# Patient Record
Sex: MALE | Race: WHITE | Employment: FULL TIME | URBAN - METROPOLITAN AREA
[De-identification: names, ages, dates, MRNs, and addresses within clinical notes are randomized per-mention and may not be internally consistent; named-entity substitution may affect disease eponyms.]

---

## 2017-02-01 DIAGNOSIS — Z01.818 PREOP TESTING: ICD-10-CM

## 2017-02-01 DIAGNOSIS — I26.99 OTHER ACUTE PULMONARY EMBOLISM WITHOUT ACUTE COR PULMONALE (HCC): Primary | ICD-10-CM

## 2017-02-04 ENCOUNTER — EKG ENCOUNTER (OUTPATIENT)
Dept: LAB | Age: 47
End: 2017-02-04
Attending: INTERNAL MEDICINE
Payer: COMMERCIAL

## 2017-02-04 DIAGNOSIS — I26.99 OTHER ACUTE PULMONARY EMBOLISM WITHOUT ACUTE COR PULMONALE (HCC): ICD-10-CM

## 2017-02-04 DIAGNOSIS — Z01.818 PREOP TESTING: ICD-10-CM

## 2017-02-04 DIAGNOSIS — Z79.01 PULMONARY EMBOLISM ON LONG-TERM ANTICOAGULATION THERAPY (HCC): ICD-10-CM

## 2017-02-04 DIAGNOSIS — I26.99 PULMONARY EMBOLISM ON LONG-TERM ANTICOAGULATION THERAPY (HCC): ICD-10-CM

## 2017-02-04 LAB
ALBUMIN SERPL-MCNC: 4.1 G/DL (ref 3.5–4.8)
ALP LIVER SERPL-CCNC: 98 U/L (ref 45–117)
ALT SERPL-CCNC: 39 U/L (ref 17–63)
AST SERPL-CCNC: 27 U/L (ref 15–41)
ATRIAL RATE: 93 BPM
BASOPHILS # BLD AUTO: 0.03 X10(3) UL (ref 0–0.1)
BASOPHILS NFR BLD AUTO: 0.5 %
BILIRUB SERPL-MCNC: 0.4 MG/DL (ref 0.1–2)
BUN BLD-MCNC: 23 MG/DL (ref 8–20)
CALCIUM BLD-MCNC: 8.6 MG/DL (ref 8.3–10.3)
CHLORIDE: 107 MMOL/L (ref 101–111)
CO2: 23 MMOL/L (ref 22–32)
CREAT BLD-MCNC: 1.24 MG/DL (ref 0.7–1.3)
EOSINOPHIL # BLD AUTO: 0.49 X10(3) UL (ref 0–0.3)
EOSINOPHIL NFR BLD AUTO: 8.1 %
ERYTHROCYTE [DISTWIDTH] IN BLOOD BY AUTOMATED COUNT: 12.5 % (ref 11.5–16)
GLUCOSE BLD-MCNC: 80 MG/DL (ref 70–99)
HCT VFR BLD AUTO: 46.7 % (ref 37–53)
HGB BLD-MCNC: 16.1 G/DL (ref 13–17)
IMMATURE GRANULOCYTE COUNT: 0.01 X10(3) UL (ref 0–1)
IMMATURE GRANULOCYTE RATIO %: 0.2 %
LYMPHOCYTES # BLD AUTO: 1.98 X10(3) UL (ref 0.9–4)
LYMPHOCYTES NFR BLD AUTO: 32.7 %
M PROTEIN MFR SERPL ELPH: 7.2 G/DL (ref 6.1–8.3)
MCH RBC QN AUTO: 31.8 PG (ref 27–33.2)
MCHC RBC AUTO-ENTMCNC: 34.5 G/DL (ref 31–37)
MCV RBC AUTO: 92.3 FL (ref 80–99)
MONOCYTES # BLD AUTO: 0.69 X10(3) UL (ref 0.1–0.6)
MONOCYTES NFR BLD AUTO: 11.4 %
NEUTROPHIL ABS PRELIM: 2.86 X10 (3) UL (ref 1.3–6.7)
NEUTROPHILS # BLD AUTO: 2.86 X10(3) UL (ref 1.3–6.7)
NEUTROPHILS NFR BLD AUTO: 47.1 %
P AXIS: 44 DEGREES
P-R INTERVAL: 132 MS
PLATELET # BLD AUTO: 201 10(3)UL (ref 150–450)
POTASSIUM SERPL-SCNC: 3.6 MMOL/L (ref 3.6–5.1)
Q-T INTERVAL: 364 MS
QRS DURATION: 82 MS
QTC CALCULATION (BEZET): 452 MS
R AXIS: 3 DEGREES
RBC # BLD AUTO: 5.06 X10(6)UL (ref 4.3–5.7)
RED CELL DISTRIBUTION WIDTH-SD: 41.7 FL (ref 35.1–46.3)
SODIUM SERPL-SCNC: 141 MMOL/L (ref 136–144)
T AXIS: -7 DEGREES
VENTRICULAR RATE: 93 BPM
WBC # BLD AUTO: 6.1 X10(3) UL (ref 4–13)

## 2017-02-04 PROCEDURE — 93005 ELECTROCARDIOGRAM TRACING: CPT

## 2017-02-04 PROCEDURE — 93010 ELECTROCARDIOGRAM REPORT: CPT | Performed by: INTERNAL MEDICINE

## 2017-02-04 PROCEDURE — 80053 COMPREHEN METABOLIC PANEL: CPT

## 2017-02-04 PROCEDURE — 36415 COLL VENOUS BLD VENIPUNCTURE: CPT

## 2017-02-04 PROCEDURE — 85025 COMPLETE CBC W/AUTO DIFF WBC: CPT

## 2017-02-06 ENCOUNTER — TELEPHONE (OUTPATIENT)
Dept: HEMATOLOGY/ONCOLOGY | Facility: HOSPITAL | Age: 47
End: 2017-02-06

## 2017-02-06 NOTE — TELEPHONE ENCOUNTER
Pre-op instructions called to patient. Per Dr. Elian Rodriguez, hold Xarelto 72 hours prior to surgery. Restart at 20mg daily 48-72 hours after per podiatrist's comfort. Pre-op forms faxed to [facility] and [facility], attn: Sunny Fernandez for surgery 2/10.

## 2017-02-06 NOTE — TELEPHONE ENCOUNTER
Mr. Caleb Lawrence is scheduled to get a right cheilectomy with possible 1st metatarsal head implant arthroplasty on 2/10/17. He is currently on xarelto 20mg daily for history of unprovoked PE without DVT.       Recent CBC, CMP, EKG are unremarkable as below:    L

## 2017-02-25 ENCOUNTER — APPOINTMENT (OUTPATIENT)
Dept: GENERAL RADIOLOGY | Age: 47
End: 2017-02-25
Attending: EMERGENCY MEDICINE
Payer: COMMERCIAL

## 2017-02-25 ENCOUNTER — HOSPITAL ENCOUNTER (EMERGENCY)
Age: 47
Discharge: HOME | End: 2017-02-25
Attending: EMERGENCY MEDICINE
Payer: COMMERCIAL

## 2017-02-25 ENCOUNTER — APPOINTMENT (OUTPATIENT)
Dept: CT IMAGING | Age: 47
End: 2017-02-25
Attending: EMERGENCY MEDICINE
Payer: COMMERCIAL

## 2017-02-25 VITALS
TEMPERATURE: 98 F | HEIGHT: 71 IN | WEIGHT: 230 LBS | BODY MASS INDEX: 32.2 KG/M2 | HEART RATE: 98 BPM | OXYGEN SATURATION: 99 % | RESPIRATION RATE: 18 BRPM | SYSTOLIC BLOOD PRESSURE: 158 MMHG | DIASTOLIC BLOOD PRESSURE: 101 MMHG

## 2017-02-25 DIAGNOSIS — R13.10 PAINFUL SWALLOWING: ICD-10-CM

## 2017-02-25 DIAGNOSIS — R10.11 ABDOMINAL PAIN, RIGHT UPPER QUADRANT: Primary | ICD-10-CM

## 2017-02-25 DIAGNOSIS — R19.7 DIARRHEA, UNSPECIFIED TYPE: ICD-10-CM

## 2017-02-25 LAB
ALBUMIN SERPL-MCNC: 3.5 G/DL (ref 3.5–4.8)
ALP LIVER SERPL-CCNC: 96 U/L (ref 45–117)
ALT SERPL-CCNC: 89 U/L (ref 17–63)
AST SERPL-CCNC: 39 U/L (ref 15–41)
BASOPHILS # BLD AUTO: 0.03 X10(3) UL (ref 0–0.1)
BASOPHILS NFR BLD AUTO: 0.4 %
BILIRUB SERPL-MCNC: 0.3 MG/DL (ref 0.1–2)
BILIRUB UR QL STRIP.AUTO: NEGATIVE
BUN BLD-MCNC: 24 MG/DL (ref 8–20)
CALCIUM BLD-MCNC: 8.7 MG/DL (ref 8.3–10.3)
CHLORIDE: 107 MMOL/L (ref 101–111)
CLARITY UR REFRACT.AUTO: CLEAR
CO2: 22 MMOL/L (ref 22–32)
COLOR UR AUTO: YELLOW
CREAT BLD-MCNC: 1.17 MG/DL (ref 0.7–1.3)
EOSINOPHIL # BLD AUTO: 0.22 X10(3) UL (ref 0–0.3)
EOSINOPHIL NFR BLD AUTO: 2.7 %
ERYTHROCYTE [DISTWIDTH] IN BLOOD BY AUTOMATED COUNT: 12.2 % (ref 11.5–16)
GLUCOSE BLD-MCNC: 108 MG/DL (ref 70–99)
GLUCOSE UR STRIP.AUTO-MCNC: NEGATIVE MG/DL
HCT VFR BLD AUTO: 45.5 % (ref 37–53)
HGB BLD-MCNC: 16.4 G/DL (ref 13–17)
IMMATURE GRANULOCYTE COUNT: 0.01 X10(3) UL (ref 0–1)
IMMATURE GRANULOCYTE RATIO %: 0.1 %
KETONES UR STRIP.AUTO-MCNC: NEGATIVE MG/DL
LEUKOCYTE ESTERASE UR QL STRIP.AUTO: NEGATIVE
LIPASE: 377 U/L (ref 73–393)
LYMPHOCYTES # BLD AUTO: 1.62 X10(3) UL (ref 0.9–4)
LYMPHOCYTES NFR BLD AUTO: 20.2 %
M PROTEIN MFR SERPL ELPH: 7 G/DL (ref 6.1–8.3)
MCH RBC QN AUTO: 32 PG (ref 27–33.2)
MCHC RBC AUTO-ENTMCNC: 36 G/DL (ref 31–37)
MCV RBC AUTO: 88.7 FL (ref 80–99)
MONOCYTES # BLD AUTO: 0.83 X10(3) UL (ref 0.1–0.6)
MONOCYTES NFR BLD AUTO: 10.4 %
NEUTROPHIL ABS PRELIM: 5.3 X10 (3) UL (ref 1.3–6.7)
NEUTROPHILS # BLD AUTO: 5.3 X10(3) UL (ref 1.3–6.7)
NEUTROPHILS NFR BLD AUTO: 66.2 %
NITRITE UR QL STRIP.AUTO: NEGATIVE
PH UR STRIP.AUTO: 5.5 [PH] (ref 4.5–8)
PLATELET # BLD AUTO: 160 10(3)UL (ref 150–450)
POTASSIUM SERPL-SCNC: 3.4 MMOL/L (ref 3.6–5.1)
PROT UR STRIP.AUTO-MCNC: NEGATIVE MG/DL
RBC # BLD AUTO: 5.13 X10(6)UL (ref 4.3–5.7)
RED CELL DISTRIBUTION WIDTH-SD: 39.8 FL (ref 35.1–46.3)
SODIUM SERPL-SCNC: 140 MMOL/L (ref 136–144)
SP GR UR STRIP.AUTO: >=1.03 (ref 1–1.03)
UROBILINOGEN UR STRIP.AUTO-MCNC: 0.2 MG/DL
WBC # BLD AUTO: 8 X10(3) UL (ref 4–13)

## 2017-02-25 PROCEDURE — 71010 XR CHEST AP PORTABLE  (CPT=71010): CPT

## 2017-02-25 PROCEDURE — 74177 CT ABD & PELVIS W/CONTRAST: CPT

## 2017-02-25 PROCEDURE — 99284 EMERGENCY DEPT VISIT MOD MDM: CPT

## 2017-02-25 PROCEDURE — 81001 URINALYSIS AUTO W/SCOPE: CPT | Performed by: EMERGENCY MEDICINE

## 2017-02-25 PROCEDURE — 96360 HYDRATION IV INFUSION INIT: CPT

## 2017-02-25 PROCEDURE — 85025 COMPLETE CBC W/AUTO DIFF WBC: CPT

## 2017-02-25 PROCEDURE — 83690 ASSAY OF LIPASE: CPT

## 2017-02-25 PROCEDURE — 80053 COMPREHEN METABOLIC PANEL: CPT

## 2017-02-25 RX ORDER — SUCRALFATE 1 G/1
1 TABLET ORAL
Qty: 120 TABLET | Refills: 0 | Status: SHIPPED | OUTPATIENT
Start: 2017-02-25 | End: 2017-03-27

## 2017-02-25 RX ORDER — OMEPRAZOLE 40 MG/1
40 CAPSULE, DELAYED RELEASE ORAL DAILY
Qty: 30 CAPSULE | Refills: 0 | Status: SHIPPED | OUTPATIENT
Start: 2017-02-25 | End: 2017-03-27

## 2017-02-25 NOTE — ED INITIAL ASSESSMENT (HPI)
Pt states having epigastric pain, when swallowing pain is stabbing, very bloated, having chronic diarrhea since doreen 2 yrs ago

## 2017-02-25 NOTE — ED PROVIDER NOTES
Patient Seen in: THE [facility] Emergency Department In [place]    History   Patient presents with:  Abdomen/Flank Pain (GI/)    Stated Complaint: epigastric pain    HPI    29-year-old male presents to the emergency department complaining of epigastric and CHOLECYSTECTOMY  8/2015       Medications :   Omeprazole 40 MG Oral Capsule Delayed Release,  Take 1 capsule (40 mg total) by mouth daily.    sucralfate (CARAFATE) 1 g Oral Tab,  Take 1 tablet (1 g total) by mouth 4 (four) times daily before meals and ni lying on a gurney. Vital signs were reviewed per nurse's notes. HEENT: Normocephalic atraumatic. Anicteric sclera. Oral mucosa is moist.  Oropharynx is normal.  Neck: No adenopathy or thyromegaly. Lungs are clear to auscultation.   Heart exam: Normal S cm in fluid attenuation.     Postsurgical changes involving the proximal femora with bilateral periarticular heterotopic bone formation consistent with prior surgery or trauma.     Incidental 1.7 cm subcutaneous nodule anterior chest wall on image #1 most

## 2017-05-15 ENCOUNTER — TELEPHONE (OUTPATIENT)
Dept: HEMATOLOGY/ONCOLOGY | Facility: HOSPITAL | Age: 47
End: 2017-05-15

## 2017-05-15 NOTE — TELEPHONE ENCOUNTER
Patient sprained his ankle this past Thursday, continued swelling and pain. Had examined at the time and told not broken. Wondering if the Xarelto is causing the edema to remain? Per Dr. Katherin Bradley not causing edema.   He should remain on and not st

## 2017-05-20 ENCOUNTER — HOSPITAL ENCOUNTER (OUTPATIENT)
Age: 47
Discharge: HOME | End: 2017-05-20
Attending: FAMILY MEDICINE
Payer: COMMERCIAL

## 2017-05-20 ENCOUNTER — APPOINTMENT (OUTPATIENT)
Dept: ULTRASOUND IMAGING | Age: 47
End: 2017-05-20
Attending: FAMILY MEDICINE
Payer: COMMERCIAL

## 2017-05-20 VITALS
HEART RATE: 96 BPM | SYSTOLIC BLOOD PRESSURE: 159 MMHG | WEIGHT: 230 LBS | TEMPERATURE: 98 F | DIASTOLIC BLOOD PRESSURE: 112 MMHG | OXYGEN SATURATION: 97 % | BODY MASS INDEX: 32 KG/M2 | RESPIRATION RATE: 18 BRPM

## 2017-05-20 DIAGNOSIS — L03.115 CELLULITIS OF RIGHT ANKLE: Primary | ICD-10-CM

## 2017-05-20 PROCEDURE — 93971 EXTREMITY STUDY: CPT | Performed by: FAMILY MEDICINE

## 2017-05-20 PROCEDURE — 99214 OFFICE O/P EST MOD 30 MIN: CPT

## 2017-05-20 PROCEDURE — 99204 OFFICE O/P NEW MOD 45 MIN: CPT

## 2017-05-20 RX ORDER — ACETAMINOPHEN 500 MG
1000 TABLET ORAL ONCE
Status: COMPLETED | OUTPATIENT
Start: 2017-05-20 | End: 2017-05-20

## 2017-05-20 RX ORDER — HYDROCODONE BITARTRATE AND ACETAMINOPHEN 5; 325 MG/1; MG/1
1-2 TABLET ORAL EVERY 6 HOURS PRN
Qty: 15 TABLET | Refills: 0 | Status: SHIPPED | OUTPATIENT
Start: 2017-05-20 | End: 2017-05-27

## 2017-05-20 NOTE — ED NOTES
Returned from ultrasound per wheel chair. States pain unchanged. lrg elevated and cool packs reapplied.

## 2017-05-20 NOTE — ED INITIAL ASSESSMENT (HPI)
Patient state that he injured his right ankle /right foot on May 11 th. Patient states that he twisted his right foot and ankle and then a cinder block fell onto his right ankle.  Patient states that he was was seen at [facility] ED on Mat 12 th and had an x

## 2017-05-22 NOTE — ED NOTES
Received a call from Dr. Javier Loud and ankle office stating patient is at the office for follow-up visit and wants [facility] doctor's note from 5/20/2017. Fax over fax verification request form, received filled-out info. Fax transmittal completed.

## 2017-06-06 ENCOUNTER — HOSPITAL ENCOUNTER (INPATIENT)
Facility: HOSPITAL | Age: 47
LOS: 2 days | Discharge: HOME | DRG: 603 | End: 2017-06-08
Attending: EMERGENCY MEDICINE | Admitting: HOSPITALIST
Payer: COMMERCIAL

## 2017-06-06 ENCOUNTER — APPOINTMENT (OUTPATIENT)
Dept: GENERAL RADIOLOGY | Age: 47
DRG: 603 | End: 2017-06-06
Attending: EMERGENCY MEDICINE
Payer: COMMERCIAL

## 2017-06-06 ENCOUNTER — APPOINTMENT (OUTPATIENT)
Dept: CT IMAGING | Age: 47
DRG: 603 | End: 2017-06-06
Attending: EMERGENCY MEDICINE
Payer: COMMERCIAL

## 2017-06-06 ENCOUNTER — APPOINTMENT (OUTPATIENT)
Dept: ULTRASOUND IMAGING | Age: 47
DRG: 603 | End: 2017-06-06
Attending: EMERGENCY MEDICINE
Payer: COMMERCIAL

## 2017-06-06 DIAGNOSIS — T14.8XXA HEMATOMA: ICD-10-CM

## 2017-06-06 DIAGNOSIS — E87.2 LACTIC ACIDOSIS: ICD-10-CM

## 2017-06-06 DIAGNOSIS — F10.920 ALCOHOL INTOXICATION, UNCOMPLICATED (HCC): ICD-10-CM

## 2017-06-06 DIAGNOSIS — L03.115 CELLULITIS OF RIGHT LOWER EXTREMITY: Primary | ICD-10-CM

## 2017-06-06 DIAGNOSIS — I89.1 ASCENDING LYMPHANGITIS: ICD-10-CM

## 2017-06-06 PROCEDURE — 71010 XR CHEST AP PORTABLE  (CPT=71010): CPT | Performed by: EMERGENCY MEDICINE

## 2017-06-06 PROCEDURE — 99220 INITIAL OBSERVATION CARE,LEVL III: CPT | Performed by: INTERNAL MEDICINE

## 2017-06-06 PROCEDURE — 73610 X-RAY EXAM OF ANKLE: CPT | Performed by: EMERGENCY MEDICINE

## 2017-06-06 PROCEDURE — 71275 CT ANGIOGRAPHY CHEST: CPT | Performed by: EMERGENCY MEDICINE

## 2017-06-06 PROCEDURE — 93971 EXTREMITY STUDY: CPT | Performed by: EMERGENCY MEDICINE

## 2017-06-06 RX ORDER — HYDROMORPHONE HYDROCHLORIDE 1 MG/ML
0.2 INJECTION, SOLUTION INTRAMUSCULAR; INTRAVENOUS; SUBCUTANEOUS EVERY 2 HOUR PRN
Status: DISCONTINUED | OUTPATIENT
Start: 2017-06-06 | End: 2017-06-08

## 2017-06-06 RX ORDER — LABETALOL HYDROCHLORIDE 5 MG/ML
10 INJECTION, SOLUTION INTRAVENOUS EVERY 4 HOURS PRN
Status: DISCONTINUED | OUTPATIENT
Start: 2017-06-06 | End: 2017-06-08

## 2017-06-06 RX ORDER — KETOROLAC TROMETHAMINE 30 MG/ML
15 INJECTION, SOLUTION INTRAMUSCULAR; INTRAVENOUS ONCE
Status: COMPLETED | OUTPATIENT
Start: 2017-06-06 | End: 2017-06-06

## 2017-06-06 RX ORDER — LORAZEPAM 1 MG/1
2 TABLET ORAL
Status: DISCONTINUED | OUTPATIENT
Start: 2017-06-06 | End: 2017-06-08

## 2017-06-06 RX ORDER — SODIUM CHLORIDE 9 MG/ML
INJECTION, SOLUTION INTRAVENOUS CONTINUOUS
Status: DISCONTINUED | OUTPATIENT
Start: 2017-06-06 | End: 2017-06-07

## 2017-06-06 RX ORDER — HYDROMORPHONE HYDROCHLORIDE 1 MG/ML
1 INJECTION, SOLUTION INTRAMUSCULAR; INTRAVENOUS; SUBCUTANEOUS ONCE
Status: COMPLETED | OUTPATIENT
Start: 2017-06-06 | End: 2017-06-06

## 2017-06-06 RX ORDER — HYDROMORPHONE HYDROCHLORIDE 1 MG/ML
1 INJECTION, SOLUTION INTRAMUSCULAR; INTRAVENOUS; SUBCUTANEOUS EVERY 30 MIN PRN
Status: DISCONTINUED | OUTPATIENT
Start: 2017-06-06 | End: 2017-06-08

## 2017-06-06 RX ORDER — HYDROMORPHONE HYDROCHLORIDE 1 MG/ML
0.4 INJECTION, SOLUTION INTRAMUSCULAR; INTRAVENOUS; SUBCUTANEOUS EVERY 2 HOUR PRN
Status: DISCONTINUED | OUTPATIENT
Start: 2017-06-06 | End: 2017-06-08

## 2017-06-06 RX ORDER — ONDANSETRON 2 MG/ML
4 INJECTION INTRAMUSCULAR; INTRAVENOUS ONCE
Status: COMPLETED | OUTPATIENT
Start: 2017-06-06 | End: 2017-06-06

## 2017-06-06 RX ORDER — LORAZEPAM 1 MG/1
1 TABLET ORAL
Status: DISCONTINUED | OUTPATIENT
Start: 2017-06-06 | End: 2017-06-08

## 2017-06-06 RX ORDER — LORAZEPAM 2 MG/ML
1 INJECTION INTRAMUSCULAR
Status: DISCONTINUED | OUTPATIENT
Start: 2017-06-06 | End: 2017-06-08

## 2017-06-06 RX ORDER — HYDROMORPHONE HYDROCHLORIDE 1 MG/ML
0.8 INJECTION, SOLUTION INTRAMUSCULAR; INTRAVENOUS; SUBCUTANEOUS EVERY 2 HOUR PRN
Status: DISCONTINUED | OUTPATIENT
Start: 2017-06-06 | End: 2017-06-08

## 2017-06-06 RX ORDER — LORAZEPAM 2 MG/ML
1 INJECTION INTRAMUSCULAR ONCE
Status: COMPLETED | OUTPATIENT
Start: 2017-06-06 | End: 2017-06-06

## 2017-06-06 RX ORDER — MORPHINE SULFATE 4 MG/ML
4 INJECTION, SOLUTION INTRAMUSCULAR; INTRAVENOUS ONCE
Status: COMPLETED | OUTPATIENT
Start: 2017-06-06 | End: 2017-06-06

## 2017-06-06 RX ORDER — ONDANSETRON 2 MG/ML
4 INJECTION INTRAMUSCULAR; INTRAVENOUS EVERY 4 HOURS PRN
Status: DISCONTINUED | OUTPATIENT
Start: 2017-06-06 | End: 2017-06-06 | Stop reason: ALTCHOICE

## 2017-06-06 RX ORDER — HYDROMORPHONE HYDROCHLORIDE 1 MG/ML
0.5 INJECTION, SOLUTION INTRAMUSCULAR; INTRAVENOUS; SUBCUTANEOUS EVERY 30 MIN PRN
Status: ACTIVE | OUTPATIENT
Start: 2017-06-06 | End: 2017-06-06

## 2017-06-06 RX ORDER — ACETAMINOPHEN 325 MG/1
650 TABLET ORAL EVERY 6 HOURS PRN
Status: DISCONTINUED | OUTPATIENT
Start: 2017-06-06 | End: 2017-06-08

## 2017-06-06 RX ORDER — LORAZEPAM 2 MG/ML
2 INJECTION INTRAMUSCULAR
Status: DISCONTINUED | OUTPATIENT
Start: 2017-06-06 | End: 2017-06-08

## 2017-06-06 RX ORDER — ONDANSETRON 2 MG/ML
4 INJECTION INTRAMUSCULAR; INTRAVENOUS EVERY 6 HOURS PRN
Status: DISCONTINUED | OUTPATIENT
Start: 2017-06-06 | End: 2017-06-08

## 2017-06-06 RX ORDER — ALBUTEROL SULFATE 90 UG/1
2 AEROSOL, METERED RESPIRATORY (INHALATION) EVERY 6 HOURS PRN
Status: DISCONTINUED | OUTPATIENT
Start: 2017-06-06 | End: 2017-06-08

## 2017-06-06 RX ORDER — ENOXAPARIN SODIUM 100 MG/ML
40 INJECTION SUBCUTANEOUS DAILY
Status: DISCONTINUED | OUTPATIENT
Start: 2017-06-06 | End: 2017-06-06

## 2017-06-06 RX ORDER — SODIUM CHLORIDE 9 MG/ML
INJECTION, SOLUTION INTRAVENOUS CONTINUOUS
Status: ACTIVE | OUTPATIENT
Start: 2017-06-06 | End: 2017-06-06

## 2017-06-06 NOTE — RESPIRATORY THERAPY NOTE
i-STAT Testing  Site l radial  Time 1645  Vishal's test result positive l radial  Clinical data room air

## 2017-06-06 NOTE — ED PROVIDER NOTES
Patient Seen in: THE [facility] Emergency Department In [place]    History   Patient presents with:  Arrythmia/Palpitations (cardiovascular)  Deep Vein Thrombosis (cardiovascular)    Stated Complaint: palpitations, hx of dvt, ankle swollen    HPI    Patient i History of blood clots            Past Surgical History    SINUS SURGERY        Comment x4    MIDDLE EAR SURGERY PROC UNLISTED      Comment ear tube to assist w/drainage    TONSILLECTOMY  2002    DENTAL SURGERY PROCEDURE      Comment multiple root canals 1332 None (Room air)       Current:/103 mmHg  Pulse 114  Temp(Src) 97.6 °F (36.4 °C) (Oral)  Resp 20  Ht 180.3 cm (5' 11\")  Wt 104. 327 kg  BMI 32.09 kg/m2  SpO2 99%        Physical Exam    General: Patient is awake, alert, appears mildly uncomfortab ALCOHOL - Abnormal; Notable for the following:     Ethyl Alcohol 190 (*)     All other components within normal limits   RBC MORPHOLOGY SCAN - Abnormal; Notable for the following:     Platelet Morphology See morphology below (*)     Clumped Platelets Small opacification of the pulmonary arteries. There is adequate evaluation to the level of the segmental pulmonary arteries. .  No evidence of pulmonary embolus  The thoracic aorta is of normal course and caliber without evidence of stenosis, aneurysmal dilata intoxication, uncomplicated ([facility] [address]  Lactic acidosis    Disposition:  Admit    Follow-up:  No follow-up provider specified.     Medications Prescribed:  Current Discharge Medication List        Present on Admission           ICD-10-CM Noted POA    Cellulitis

## 2017-06-06 NOTE — ED INITIAL ASSESSMENT (HPI)
Pt states he had a hematoma evacuated from r leg 5/17 draining yellow fluids and on antibiotics. Now having redness and swelling went to see surgeon today and told to come here due to tachycardia.

## 2017-06-07 PROCEDURE — 99225 SUBSEQUENT OBSERVATION CARE: CPT | Performed by: HOSPITALIST

## 2017-06-07 RX ORDER — TEMAZEPAM 15 MG/1
15 CAPSULE ORAL NIGHTLY PRN
Status: DISCONTINUED | OUTPATIENT
Start: 2017-06-07 | End: 2017-06-08

## 2017-06-07 RX ORDER — LISINOPRIL 20 MG/1
20 TABLET ORAL DAILY
Qty: 30 TABLET | Refills: 0 | Status: SHIPPED | OUTPATIENT
Start: 2017-06-07 | End: 2017-06-08

## 2017-06-07 RX ORDER — HYDROCODONE BITARTRATE AND ACETAMINOPHEN 5; 325 MG/1; MG/1
2 TABLET ORAL EVERY 4 HOURS PRN
Status: DISCONTINUED | OUTPATIENT
Start: 2017-06-07 | End: 2017-06-08

## 2017-06-07 RX ORDER — CEPHALEXIN 500 MG/1
500 CAPSULE ORAL 2 TIMES DAILY
Qty: 14 CAPSULE | Refills: 0 | Status: SHIPPED | OUTPATIENT
Start: 2017-06-07 | End: 2017-06-14

## 2017-06-07 RX ORDER — HYDROCODONE BITARTRATE AND ACETAMINOPHEN 5; 325 MG/1; MG/1
1 TABLET ORAL EVERY 4 HOURS PRN
Status: DISCONTINUED | OUTPATIENT
Start: 2017-06-07 | End: 2017-06-08

## 2017-06-07 RX ORDER — LISINOPRIL 40 MG/1
40 TABLET ORAL DAILY
Status: DISCONTINUED | OUTPATIENT
Start: 2017-06-08 | End: 2017-06-08

## 2017-06-07 RX ORDER — LISINOPRIL 10 MG/1
20 TABLET ORAL DAILY
Status: DISCONTINUED | OUTPATIENT
Start: 2017-06-07 | End: 2017-06-07

## 2017-06-07 RX ORDER — LISINOPRIL 10 MG/1
20 TABLET ORAL ONCE
Status: DISCONTINUED | OUTPATIENT
Start: 2017-06-07 | End: 2017-06-08

## 2017-06-07 RX ORDER — POTASSIUM CHLORIDE 20 MEQ/1
40 TABLET, EXTENDED RELEASE ORAL EVERY 4 HOURS
Status: COMPLETED | OUTPATIENT
Start: 2017-06-07 | End: 2017-06-07

## 2017-06-07 RX ORDER — HYDRALAZINE HYDROCHLORIDE 20 MG/ML
10 INJECTION INTRAMUSCULAR; INTRAVENOUS EVERY 6 HOURS PRN
Status: DISCONTINUED | OUTPATIENT
Start: 2017-06-07 | End: 2017-06-08

## 2017-06-07 RX ORDER — ACETAMINOPHEN 325 MG/1
650 TABLET ORAL EVERY 4 HOURS PRN
Status: DISCONTINUED | OUTPATIENT
Start: 2017-06-07 | End: 2017-06-08

## 2017-06-07 NOTE — H&P
[facility] H[facility]ITAL[facility]  History and Physical     Sanjeev Massey Patient Status:  Observation    5/15/1970 MRN [id number]   [facility] 3NE-A Attending Linda Rowe MD   Hosp Day # 0 PCP None Pcp     Chief Complaint: ankle pain    History of Social History:  reports that he has been smoking Cigarettes. He has a 120 pack-year smoking history. He does not have any smokeless tobacco history on file. He reports that he drinks alcohol. He reports that he does not use illicit drugs.     Family extremities. ++ hand tremors   Extremities: No edema or cyanosis. Integument: mild erythema on right ankle, some serosanguineous drainage seen. No pus   Psychiatric: Appropriate mood and affect.       Diagnostic Data:      Labs:  Recent Labs   Lab  06/06/1

## 2017-06-07 NOTE — PLAN OF CARE
Pt is alert and oriented x4, states pain to right ankle medication given. BP elevated, HR elevated, better after pain medication and fluids. CIWA protocol in place.  Plan of care discussed with pt and questions answered, will continue to monitor     [facility]

## 2017-06-07 NOTE — PLAN OF CARE
NURSING ADMISSION NOTE      Patient admitted via Ambulance at [address] [facility] [address]. Oriented to room. Safety precautions initiated. Bed in low position. Call light in reach. Admission navigator completed.

## 2017-06-07 NOTE — PAYOR COMM NOTE
--------------  ADMISSION REVIEW     Payor: [id number] [place] [facility]    6/6    ED    Arrythmia/Palpitations (cardiovascular)  Deep Vein Thrombosis (cardiovascular)    Stated Complaint: palpitations, hx of dvt, ankle swollen        a 26-year-old with a history of GERD lymphangitic streaking.  No unilateral calf swelling or calf tenderness to palpation. .  Skin: warm and dry, no diaphoresis       Diagnosis  Date    •  Chronic sinusitis      •  GERD (gastroesophageal reflux disease)      •  Broken legs  1992        both le

## 2017-06-07 NOTE — PROGRESS NOTES
[facility] H[facility]PITAL[facility]  Progress Note     Toya Robles Patient Status:  Observation    5/15/1970 MRN [id number]   [facility] 3NE-A Attending Fatmata Vaz, [address] [facility] Day # 1 PCP None Pcp     Chief Complaint: ankle pain/redness    S: Patient se ASSESSMENT / PLAN:         1. Cellulitis of right ankle    1. Improved this AM  2. Continue ancef  3. Follow blood cultures  2. Factor V def  1. Xarelto   3. ETOH use  1. No evidence of withdrawal at this time   4.  Elevated BP - HTN  1. BP elevated u

## 2017-06-07 NOTE — BH PROGRESS NOTE
Pt seen and refused assessment or any cd referrals. He said, he doesn't have a problem with etoh. He admits to 2 drinks per day. His nurse is aware.

## 2017-06-08 VITALS
SYSTOLIC BLOOD PRESSURE: 143 MMHG | TEMPERATURE: 98 F | BODY MASS INDEX: 32.2 KG/M2 | OXYGEN SATURATION: 99 % | WEIGHT: 230 LBS | HEART RATE: 81 BPM | HEIGHT: 71 IN | DIASTOLIC BLOOD PRESSURE: 84 MMHG | RESPIRATION RATE: 20 BRPM

## 2017-06-08 PROCEDURE — 99217 OBSERVATION CARE DISCHARGE: CPT | Performed by: HOSPITALIST

## 2017-06-08 RX ORDER — LISINOPRIL 40 MG/1
40 TABLET ORAL DAILY
Qty: 30 TABLET | Refills: 0 | Status: SHIPPED | OUTPATIENT
Start: 2017-06-08 | End: 2017-07-08

## 2017-06-08 NOTE — PROGRESS NOTES
NURSING DISCHARGE NOTE    Discharged Home via Wheelchair. Accompanied by Support staff  Belongings Taken by patient/family. Reviewed discharge instructions with patient. Prescriptions sent to patient's pharmacy.   Patient verbalized understanding o

## 2017-06-14 NOTE — PAYOR COMM NOTE
--------------  DISCHARGE REVIEW    Payor: [id number]  Number: [id number]    Admit date: 6/6/2017  1:26 PM  Discharge Date: 6/8/2017  2:03 PM     Admitting Physician: Zoey Sow MD    Primary Care Physician: Pcp, None          Disch Brief Synopsis: Patient admitted due to right ankle cellulitis. Patient had improvement with IV antibiotics. Blood cultures resulted negative. Patient noted to have elevated BP and started on lisinopril.  Patient clinically improved with above management an General: No acute distress. Obese. Respiratory: Clear to auscultation bilaterally. No wheezes. No rhonchi. Cardiovascular: S1, S2. Regular rate and rhythm. No murmurs, rubs or gallops. Abdomen: Soft, nontender, nondistended. Positive bowel sounds.  No Neurologic: No focal neurological deficits. Musculoskeletal: Moves all extremities. Extremities: No edema. Right ankle erythema improved.  No drainage.       Diagnostic Data:       Labs:  Recent Labs    Lab  06/06/17   1325   06/06/17   4961   06/07/17   06/07/17 1959 98.6 °F (37 °C) 93 20  163/91 mmHg 98 % -- KD       06/07/17 1903 -- 97 20 151/95 mmHg 96 % -- KD       06/07/17 1815 -- 80 --  162/91 mmHg -- -- RG       06/07/17 1800 -- 84 --  161/105 mmHg -- --        06/07/17 1800 98.4 °F (36.9 °C) -

## 2017-06-27 ENCOUNTER — APPOINTMENT (OUTPATIENT)
Dept: HEMATOLOGY/ONCOLOGY | Age: 47
End: 2017-06-27
Attending: INTERNAL MEDICINE
Payer: COMMERCIAL

## 2017-11-16 ENCOUNTER — APPOINTMENT (OUTPATIENT)
Dept: CT IMAGING | Facility: HOSPITAL | Age: 47
DRG: 204 | End: 2017-11-16
Attending: EMERGENCY MEDICINE
Payer: OTHER MISCELLANEOUS

## 2017-11-16 ENCOUNTER — APPOINTMENT (OUTPATIENT)
Dept: GENERAL RADIOLOGY | Facility: HOSPITAL | Age: 47
DRG: 204 | End: 2017-11-16
Attending: EMERGENCY MEDICINE
Payer: OTHER MISCELLANEOUS

## 2017-11-16 ENCOUNTER — HOSPITAL ENCOUNTER (INPATIENT)
Facility: HOSPITAL | Age: 47
LOS: 4 days | Discharge: HOME | DRG: 204 | End: 2017-11-20
Attending: EMERGENCY MEDICINE | Admitting: HOSPITALIST
Payer: OTHER MISCELLANEOUS

## 2017-11-16 ENCOUNTER — HOSPITAL ENCOUNTER (OUTPATIENT)
Dept: SPEECH THERAPY | Facility: HOSPITAL | Age: 47
Setting detail: THERAPIES SERIES
End: 2017-11-16
Payer: OTHER MISCELLANEOUS

## 2017-11-16 ENCOUNTER — HOSPITAL ENCOUNTER (OUTPATIENT)
Dept: PHYSICAL THERAPY | Facility: HOSPITAL | Age: 47
Setting detail: THERAPIES SERIES
Discharge: HOME | End: 2017-11-16
Payer: OTHER MISCELLANEOUS

## 2017-11-16 DIAGNOSIS — R04.2 HEMOPTYSIS: Primary | ICD-10-CM

## 2017-11-16 DIAGNOSIS — R91.8 LUNG MASS: ICD-10-CM

## 2017-11-16 DIAGNOSIS — G72.81 CRITICAL ILLNESS MYOPATHY: ICD-10-CM

## 2017-11-16 PROCEDURE — 97163 PT EVAL HIGH COMPLEX 45 MIN: CPT

## 2017-11-16 PROCEDURE — 71010 XR CHEST AP PORTABLE  (CPT=71010): CPT | Performed by: EMERGENCY MEDICINE

## 2017-11-16 PROCEDURE — 99222 1ST HOSP IP/OBS MODERATE 55: CPT | Performed by: STUDENT IN AN ORGANIZED HEALTH CARE EDUCATION/TRAINING PROGRAM

## 2017-11-16 PROCEDURE — 71275 CT ANGIOGRAPHY CHEST: CPT | Performed by: EMERGENCY MEDICINE

## 2017-11-16 RX ORDER — METOPROLOL TARTRATE 50 MG/1
50 TABLET, FILM COATED ORAL 2 TIMES DAILY
Status: DISCONTINUED | OUTPATIENT
Start: 2017-11-16 | End: 2017-11-20

## 2017-11-16 RX ORDER — IPRATROPIUM BROMIDE AND ALBUTEROL SULFATE 2.5; .5 MG/3ML; MG/3ML
3 SOLUTION RESPIRATORY (INHALATION) EVERY 6 HOURS PRN
Status: DISCONTINUED | OUTPATIENT
Start: 2017-11-16 | End: 2017-11-20

## 2017-11-16 RX ORDER — ACETAMINOPHEN 325 MG/1
650 TABLET ORAL EVERY 6 HOURS PRN
Status: DISCONTINUED | OUTPATIENT
Start: 2017-11-16 | End: 2017-11-20

## 2017-11-16 RX ORDER — METOPROLOL TARTRATE 50 MG/1
50 TABLET, FILM COATED ORAL 2 TIMES DAILY
COMMUNITY
End: 2019-04-04 | Stop reason: ALTCHOICE

## 2017-11-16 RX ORDER — MAGNESIUM OXIDE 400 MG (241.3 MG MAGNESIUM) TABLET
3 TABLET NIGHTLY
Status: DISCONTINUED | OUTPATIENT
Start: 2017-11-16 | End: 2017-11-20

## 2017-11-16 RX ORDER — IPRATROPIUM BROMIDE AND ALBUTEROL SULFATE 2.5; .5 MG/3ML; MG/3ML
3 SOLUTION RESPIRATORY (INHALATION) ONCE
Status: COMPLETED | OUTPATIENT
Start: 2017-11-16 | End: 2017-11-16

## 2017-11-16 RX ORDER — ONDANSETRON 2 MG/ML
4 INJECTION INTRAMUSCULAR; INTRAVENOUS EVERY 6 HOURS PRN
Status: DISCONTINUED | OUTPATIENT
Start: 2017-11-16 | End: 2017-11-20

## 2017-11-16 RX ORDER — OXYCODONE HYDROCHLORIDE AND ACETAMINOPHEN 5; 325 MG/1; MG/1
1 TABLET ORAL EVERY 4 HOURS PRN
COMMUNITY
End: 2019-04-04 | Stop reason: ALTCHOICE

## 2017-11-16 RX ORDER — SODIUM CHLORIDE 9 MG/ML
INJECTION, SOLUTION INTRAVENOUS CONTINUOUS
Status: DISCONTINUED | OUTPATIENT
Start: 2017-11-16 | End: 2017-11-17

## 2017-11-16 RX ORDER — FAMOTIDINE 20 MG/1
20 TABLET ORAL 2 TIMES DAILY
Status: DISCONTINUED | OUTPATIENT
Start: 2017-11-16 | End: 2017-11-20

## 2017-11-16 RX ORDER — QUETIAPINE 25 MG/1
25 TABLET, FILM COATED ORAL NIGHTLY
COMMUNITY
End: 2019-04-04 | Stop reason: ALTCHOICE

## 2017-11-16 RX ORDER — METHYLPREDNISOLONE SODIUM SUCCINATE 125 MG/2ML
125 INJECTION, POWDER, LYOPHILIZED, FOR SOLUTION INTRAMUSCULAR; INTRAVENOUS ONCE
Status: COMPLETED | OUTPATIENT
Start: 2017-11-16 | End: 2017-11-16

## 2017-11-16 RX ORDER — MELATONIN
3 NIGHTLY
COMMUNITY
End: 2019-04-04 | Stop reason: ALTCHOICE

## 2017-11-16 RX ORDER — OXYCODONE HYDROCHLORIDE AND ACETAMINOPHEN 5; 325 MG/1; MG/1
1 TABLET ORAL EVERY 4 HOURS PRN
Status: DISCONTINUED | OUTPATIENT
Start: 2017-11-16 | End: 2017-11-20

## 2017-11-16 RX ORDER — QUETIAPINE 25 MG/1
25 TABLET, FILM COATED ORAL NIGHTLY
Status: DISCONTINUED | OUTPATIENT
Start: 2017-11-16 | End: 2017-11-20

## 2017-11-16 RX ORDER — FAMOTIDINE 20 MG/1
20 TABLET ORAL 2 TIMES DAILY
COMMUNITY
End: 2019-04-04 | Stop reason: ALTCHOICE

## 2017-11-16 RX ORDER — NAPROXEN 250 MG/1
250 TABLET ORAL 2 TIMES DAILY WITH MEALS
COMMUNITY
End: 2019-04-04 | Stop reason: ALTCHOICE

## 2017-11-16 NOTE — CONSULTS
Pulmonary H&P/Consult     NAME: Shilpa Munguia - ROOM: A7/A7 - MRN: [id number] - Age: 49year old - :  5/15/1970    Date of Admission: 2017 11:25 AM  Admission Diagnosis: Hemoptysis    Assessment/Plan:  1.  Hemoptysis with bilateral cavitary lesions a tracheostomy. He improved with supportive care and was transferred to rehab and then back to [facility]. He notes continued intermittent dyspnea and a persistently hoarse.  He developed rhinorrhea and a mild sore throat a few days ago and three days ago he s Never Used    Comment: quit mid dec 2016    Alcohol use Yes  0.0 oz/week     Comment: 2 beers per weekend     Medications:  • famoTIDine  20 mg Oral BID   • melatonin  3 mg Oral Nightly   • Metoprolol Tartrate  50 mg Oral BID   • Fluticasone Furoate-Vilant

## 2017-11-16 NOTE — ED PROVIDER NOTES
Patient Seen in: [facility] Emergency Department    History   Patient presents with:  Dyspnea DELIO SOB (respiratory)    Stated Complaint: worsening sob this am with hx of \"toxic exposure\" 9/19 and problems since then    HPI    78-year-old male come EXCIS LUMBAR DISK,ONE LEVEL  No date: FEMUR/KNEE SURG UNLISTED      Comment: x13  No date: KNEE REPLACEMENT SURGERY  10/2014: Althea Ennis  No date: MIDDLE EAR SURGERY PROC UNLISTED      Comment: ear tube to assist w/drainage  No date: SINUS SURGER components within normal limits   CBC W/ DIFFERENTIAL - Abnormal; Notable for the following:     RDW-SD 49.1 (*)     Monocyte Absolute 0.82 (*)     Eosinophil Absolute 0.51 (*)     All other components within normal limits   TROPONIN I - Normal   CBC WITH Further assessment with CT scanning of the chest is recommended. Dictated by: Stacey Hodges DO on 11/16/2017 at 12:29     Approved by:  Stacey Hodges DO          CT echogram of the chest shows no pulmonary emboli does show a left upper lobe/left lingular

## 2017-11-16 NOTE — ED NOTES
Completed CT form w/ Pt. Pt stable at this time, vitals stable.  Pt currently breathing w/o difficulty, NSR on cardiac monitor, no complaints, A&Ox4

## 2017-11-16 NOTE — CONSULTS
[facility] dosing service    Initial Pharmacokinetic Consult for Vancomycin Dosing     Boo Nunez is a 49year old male admitted on 11/16/2017 who is being treated for pneumonia. Pharmacy has been asked to dose Vancomycin by Dr. Mallorie Damian.     He is a ug/mL.    3.  Pharmacy will need Scr daily while on Vancomycin to assess renal function. 4.  Pharmacy will follow and monitor renal function changes, toxicity and efficacy. Pharmacy will continue to follow him.   We appreciate the opportunity to ivone

## 2017-11-16 NOTE — H&P
[facility] H[facility]PITAL[facility]  History and Physical     Jaxon Cuevas Patient Status:  Emergency    5/15/1970 MRN [id number]   [address] Attending No att. providers found   [facility] Day # 0 PCP None Pcp     Chief Complaint: DIGNAQJUSTYNA REPLACEMENT SURGERY  10/2014: Deepak Pritchett  No date: MIDDLE EAR SURGERY PROC UNLISTED      Comment: ear tube to assist w/drainage  No date: SINUS SURGERY        Comment: x4  2002: TONSILLECTOMY    Social History:  reports that he has been smoking C deformity. Abdomen: Soft, nontender, nondistended. Positive bowel sounds. No rebound, guarding or organomegaly. Neurologic: No focal neurological deficits. CNII-XII grossly intact. Musculoskeletal: Moves all extremities.   Extremities: No edema or cyano

## 2017-11-16 NOTE — PROGRESS NOTES
NEUROLOGICAL EVALUATION:   Referring Physician: Dr. Apodaca ref.  provider found  Diagnosis: Critical Illness Myopathy     Date of Service: 11/16/2017     PATIENT SUMMARY   Sylvester Busby is a 49year old y/o male who presents to therapy today with complaints of movements. Veering with walking, hard to carry backpack or lift any weights including groceries because it is so painful. Not sleeping at night due to all over pain and sweating.    Lamar Aschoff describes prior level of function independent with all ADLS, mobility, prior functional level with less discomfort. However, he has to be willing to participate in exercises that may increase his symptoms at first in order to be able to tolerate and progress activity levels. Precautions:   On blood thinners  OBJECTIVE:   Ou in intense all over body pain with every movement and refused the rest of the evaluation today. He left and went to the ER due to reports of coughing up blood this morning.  I am unsure at this time if he will return for follow-ups as he wished to cancel hi signed by therapist: Ric Hunter DPT    [de-identified] certification required: Yes  I certify the need for these services furnished under this plan of treatment and while under my care.     X___________________________________________________ Date__________

## 2017-11-17 PROCEDURE — 99232 SBSQ HOSP IP/OBS MODERATE 35: CPT | Performed by: STUDENT IN AN ORGANIZED HEALTH CARE EDUCATION/TRAINING PROGRAM

## 2017-11-17 RX ORDER — POTASSIUM CHLORIDE 20 MEQ/1
40 TABLET, EXTENDED RELEASE ORAL ONCE
Status: COMPLETED | OUTPATIENT
Start: 2017-11-17 | End: 2017-11-17

## 2017-11-17 RX ORDER — SODIUM CHLORIDE 30 MG/ML INHALATION SOLUTION 30 MG/ML
3 SOLUTION INHALANT ONCE
Status: DISCONTINUED | OUTPATIENT
Start: 2017-11-18 | End: 2017-11-17

## 2017-11-17 RX ORDER — SODIUM CHLORIDE 30 MG/ML INHALATION SOLUTION 30 MG/ML
4 SOLUTION INHALANT
Status: DISCONTINUED | OUTPATIENT
Start: 2017-11-18 | End: 2017-11-17

## 2017-11-17 RX ORDER — SODIUM CHLORIDE 30 MG/ML INHALATION SOLUTION 30 MG/ML
4 SOLUTION INHALANT
Status: DISCONTINUED | OUTPATIENT
Start: 2017-11-17 | End: 2017-11-20

## 2017-11-17 NOTE — CM/SW NOTE
Received call from Day Quinones with [address]  [facility] ([phone]) who is assigned to manage pt's workmen's comp case. Pt is a 50 y/o man who received an inhalation injury while on the job in [place].   Pt travels all year throughout the count

## 2017-11-17 NOTE — PROGRESS NOTES
Pulmonary Progress Note        NAME: Nina Oar - ROOM: [facility]- - MRN: [id number] - Age: 49year old - : 5/15/1970        SUBJECTIVE: No events overnight    OBJECTIVE:   17  1616 17  2150 17  2307 17  0610   BP: 146/92 135/89 - 1.11 Final   ----------  TSH   Date/Time Value Ref Range Status   11/17/2017 06:29 AM 0.569 0.350 - 5.500 mIU/mL Final   ----------  Albumin   Date/Time Value Ref Range Status   11/16/2017 11:44 AM 3.2 (L) 3.5 - 4.8 g/dL Final   ----------      Imaging:

## 2017-11-17 NOTE — PAYOR COMM NOTE
--------------  ADMISSION REVIEW         11/16  ED       Patient presents with:  Dyspnea DELIO SOB      Stated Complaint: worsening sob this am with hx of \"toxic exposure\" 9/19 and problems since then     HPI     58-year-old male comes the hospital chief c Phosphatase 137 (*)       Albumin 3.2 (*)       All other components within normal limits   CBC W/ DIFFERENTIAL - Abnormal; Notable for the following:      RDW-SD 49.1 (*)       Monocyte Absolute 0.82 (*)       Eosinophil Absolute 0.51 (*)       All other

## 2017-11-17 NOTE — PLAN OF CARE
Achieves optimal ventilation and oxygenation Progressing      Assumed care of patient at [facility] isolation maintained    AOx4 in NAD  Denies bloody sputum  RA; lungs diminished nonproductive cough  Hoarse voice   Sore throat  Denies chest pain  voids

## 2017-11-17 NOTE — PROGRESS NOTES
[facility]PITAL[facility]  Progress Note     Eduard Carrillo Patient Status:  Inpatient    5/15/1970 MRN [id number]   [facility] 5NW-A Attending Smitha Mathur MD   Hosp Day # 1 PCP Ede Mercado MD     Chief Complaint: hemoptysis     S: Patient Inhalation Daily   • QUEtiapine Fumarate  25 mg Oral Nightly   • cefepime  1 g Intravenous Q8H   • vancomycin  15 mg/kg Intravenous Q12H       ASSESSMENT / PLAN:     1. Hemoptysis with REJI mass vs cavitary lesion  1.  Pt w/ constitutional symptoms- airborne

## 2017-11-17 NOTE — PLAN OF CARE
Patient/Family Goals    • Patient/Family Long Term Goal Progressing    • Patient/Family Short Term Goal Progressing            Pt aox4, maintaining O2 sats on RA. VSS. Denies pain. PRN nebs. Pt unable to cough up sputum at this time.  Airborne precautions i

## 2017-11-18 PROBLEM — J98.4 CAVITARY LESION OF LUNG: Status: ACTIVE | Noted: 2017-11-16

## 2017-11-18 PROCEDURE — 99232 SBSQ HOSP IP/OBS MODERATE 35: CPT | Performed by: STUDENT IN AN ORGANIZED HEALTH CARE EDUCATION/TRAINING PROGRAM

## 2017-11-18 RX ORDER — POTASSIUM CHLORIDE 20 MEQ/1
40 TABLET, EXTENDED RELEASE ORAL ONCE
Status: COMPLETED | OUTPATIENT
Start: 2017-11-18 | End: 2017-11-18

## 2017-11-18 NOTE — PLAN OF CARE
RESPIRATORY - ADULT    • Achieves optimal ventilation and oxygenation Progressing                PATIENT A&OX4,  DENIES SOB/CHEST PAIN. SATURATING ABOVE 92% ON ROOM AIR.  + OCCASIONAL NP COUGH. LUNGS DIMINISHED. AIRBORNE ISOLATION IN PLACE.   DENIES NAUS

## 2017-11-18 NOTE — PROGRESS NOTES
Pulmonary Progress Note        NAME: Jonathan Almaguer - ROOM: 661/529-G - MRN: [id number] - Age: 49year old - : 5/15/1970    Past Medical History:   Diagnosis Date   • Asthma, mild persistent    • Back pain    • Broken legs 1992    both legs   • Bronchospa distended   Extremities:  no cyanosis or edema.    Neurologic: Oriented  Times 3       Recent Labs   Lab  11/16/17   1144  11/16/17   1716  11/17/17   0629  11/18/17   0558   RBC  4.35   --   4.16*  3.81*   HGB  13.3  13.6  12.9*  12.0*   HCT  39.4   --   3

## 2017-11-18 NOTE — PLAN OF CARE
Patient/Family Goals    • Patient/Family Long Term Goal Progressing    • Patient/Family Short Term Goal Progressing        RESPIRATORY - ADULT    • Achieves optimal ventilation and oxygenation Progressing          PROBLEM: Hemoptysis     ASSESSMENT: luz

## 2017-11-18 NOTE — PROGRESS NOTES
[facility]PITAL[facility]  Progress Note     Salvador Perez Patient Status:  Inpatient    5/15/1970 MRN [id number]   [facility] 5NW-A Attending Trisha Corea MD   Hosp Day # 2 PCP Loly Burgos MD     Chief Complaint: hemoptysis     S: Patient Nebulization Daily   • famoTIDine  20 mg Oral BID   • melatonin  3 mg Oral Nightly   • Metoprolol Tartrate  50 mg Oral BID   • Fluticasone Furoate-Vilanterol  1 puff Inhalation Daily   • QUEtiapine Fumarate  25 mg Oral Nightly   • cefepime  1 g Intravenous

## 2017-11-18 NOTE — PROGRESS NOTES
[facility] dosing service    Follow-up Pharmacokinetic Consult for Vancomycin Dosing     Leydi Paula is a 49year old male who is being treated for pneumonia. Patient is on day 3 of Vancomycin 1.5 gm IV Q 12 hours. Goal trough is 15-20 ug/mL.     He level was drawn ~ 1 hour late, therefore true trough estimated to be ~ 15 ug/ml. 2.  Pharmacy will re-check Vancomycin trough levels as needed if continued. Goal trough level 15-20 ug/mL.     3.  Pharmacy will need BUN/Scr daily while on Vancomycin to a

## 2017-11-19 PROCEDURE — 99232 SBSQ HOSP IP/OBS MODERATE 35: CPT | Performed by: STUDENT IN AN ORGANIZED HEALTH CARE EDUCATION/TRAINING PROGRAM

## 2017-11-19 RX ORDER — HYDROCODONE BITARTRATE AND ACETAMINOPHEN 5; 325 MG/1; MG/1
2 TABLET ORAL EVERY 4 HOURS PRN
Status: DISCONTINUED | OUTPATIENT
Start: 2017-11-19 | End: 2017-11-20

## 2017-11-19 RX ORDER — HYDROCODONE BITARTRATE AND ACETAMINOPHEN 5; 325 MG/1; MG/1
1 TABLET ORAL EVERY 4 HOURS PRN
Status: DISCONTINUED | OUTPATIENT
Start: 2017-11-19 | End: 2017-11-20

## 2017-11-19 NOTE — PLAN OF CARE
RESPIRATORY - ADULT    • Achieves optimal ventilation and oxygenation Progressing                PATIENT A&OX4.  FRUSTRATED. FEELS HE IS GETTING CONFLICTING INFORMATION.  REPORTS SOMEONE  TOLD HIM HE CAN GO HOME TODAY BUT HE CANT REMEMBER WHO THE PERSON WAS

## 2017-11-19 NOTE — PROGRESS NOTES
Pulmonary Progress Note     Assessment / Plan:  1. Hemoptysis with bilateral cavitary lesions from prior MSSA infection - this is from the xarelto and irritation of one of the cavities or scars in his lung. Doubt active infection.  WBC normal and PCT normal

## 2017-11-19 NOTE — PROGRESS NOTES
NURSING DISCHARGE NOTE    Discharged Home via Wheelchair. Accompanied by Support staff  Belongings Taken by patient/family. Patient discharged home. All discharge instructions, medications and follow up care discussed with patient.  All questions an

## 2017-11-19 NOTE — PROGRESS NOTES
[facility]ITAL[facility]  Progress Note     Jonathan Almaguer Patient Status:  Inpatient    5/15/1970 MRN [id number]   [facility] 5NW-A Attending Nicholas Peck MD   Hosp Day # 3 PCP Yanira Hanks MD     Chief Complaint: hemoptysis     S: Patient 11/16/17   1144   TROP  <0.046            Imaging: Imaging data reviewed in Epic.     Medications:   • sodium chloride  4 mL Nebulization Daily   • famoTIDine  20 mg Oral BID   • melatonin  3 mg Oral Nightly   • Metoprolol Tartrate  50 mg Oral BID   • Fluti

## 2017-11-20 ENCOUNTER — APPOINTMENT (OUTPATIENT)
Dept: PHYSICAL THERAPY | Facility: HOSPITAL | Age: 47
End: 2017-11-20
Payer: OTHER MISCELLANEOUS

## 2017-11-20 VITALS
RESPIRATION RATE: 18 BRPM | OXYGEN SATURATION: 98 % | SYSTOLIC BLOOD PRESSURE: 127 MMHG | HEART RATE: 74 BPM | TEMPERATURE: 98 F | WEIGHT: 210.13 LBS | HEIGHT: 71 IN | BODY MASS INDEX: 29.42 KG/M2 | DIASTOLIC BLOOD PRESSURE: 96 MMHG

## 2017-11-20 PROCEDURE — 99239 HOSP IP/OBS DSCHRG MGMT >30: CPT | Performed by: STUDENT IN AN ORGANIZED HEALTH CARE EDUCATION/TRAINING PROGRAM

## 2017-11-20 PROCEDURE — 99232 SBSQ HOSP IP/OBS MODERATE 35: CPT | Performed by: STUDENT IN AN ORGANIZED HEALTH CARE EDUCATION/TRAINING PROGRAM

## 2017-11-20 RX ORDER — CODEINE PHOSPHATE AND GUAIFENESIN 10; 100 MG/5ML; MG/5ML
5 SOLUTION ORAL EVERY 4 HOURS PRN
Status: DISCONTINUED | OUTPATIENT
Start: 2017-11-20 | End: 2017-11-20

## 2017-11-20 NOTE — PAYOR COMM NOTE
--------------  CONTINUED STAY REVIEW    Payor: [employer]  Subscriber #:  [id number]  Authorization Number: [id number]/A    Admit date: 11/16/17  Admit time: Jose A Granda 6    Admitting Physician: Yg Gill MD  Attending Physician:  Juana Georges MD  Primary Care for RAD  - breo ellipta  - bd protocol  - outpatient PFTs  3. H/o PE - factor V leiden  - no PE on CTA and his last PE was over a year ago. Given the hemoptysis, it is Ok to hold the [facility] 54. ASA when current lung issues resolve  11/19  1.  Hemoptysis wit

## 2017-11-20 NOTE — PROGRESS NOTES
NURSING DISCHARGE NOTE    Discharged Home via Wheelchair. Accompanied by Support staff  Belongings Taken by patient/family. VSS. Iv discontinued. Discharge instructions given to patient. He verbalized understanding.  Meds returned to patient from

## 2017-11-20 NOTE — PLAN OF CARE
Patient/Family Goals    • Patient/Family Long Term Goal Progressing    • Patient/Family Short Term Goal Progressing        RESPIRATORY - ADULT    • Achieves optimal ventilation and oxygenation Progressing          Pt A+Ox4.   WNL on RA, Airborne isolation p

## 2017-11-20 NOTE — PROGRESS NOTES
[facility]  Progress Note     Santy Chapa Patient Status:  Inpatient    5/15/1970 MRN [id number]   [facility] 5NW-A Attending Dara Karimi MD   Hosp Day # 4 PCP Arvind Guan MD     Chief Complaint: hemoptysis     S: Patient Inhalation Daily   • QUEtiapine Fumarate  25 mg Oral Nightly       ASSESSMENT / PLAN:     1. Hemoptysis with REJI mass vs cavitary lesion  1. Pt w/ constitutional symptoms- airborne isolation   2. Sputum Cx  3. AFB stain x3, 1/3 negative, 2 pending   4.  Mon

## 2017-11-20 NOTE — PROGRESS NOTES
Pulmonary Progress Note      NAME: Jaxon Cuevas - ROOM: [facility] - MRN: [id number] - Age: 49year old - : 5/15/1970    Assessment/Plan:  1.  Hemoptysis with bilateral cavitary lesions from prior MSSA infection - may be from the [place] [address] in the setting of tongue normal.  No thrush noted. Voice is hoarse   Lungs: Clear to auscultation bilaterally, no focal wheezes or crackles    Chest wall: No tenderness or deformity. Heart: Regular rate and rhythm, normal S1S2, no murmur.    Abdomen: soft, non-tender, non-

## 2017-11-21 ENCOUNTER — APPOINTMENT (OUTPATIENT)
Dept: SPEECH THERAPY | Facility: HOSPITAL | Age: 47
End: 2017-11-21
Payer: OTHER MISCELLANEOUS

## 2017-11-21 NOTE — DISCHARGE SUMMARY
[facility]OSPITAL[facility]  DISCHARGE SUMMARY     Jane Diaz Patient Status:  Inpatient    5/15/1970 MRN [id number]   [facility] 5NW-A Attending No att. providers found   Hosp Day # 4 PCP Chiquita Livingston MD     Date of Admission: 2017  D likely from [place] [address]. MSSA pneumonia that was recently treated . Pt initially on abx but his was d/c as no evidence of pneumonia. Pt  H-H stable during hospital stay. Will resume xarelto in one week.    D/C in stable clinical condition after clearance from c rhonchi. Cardiovascular: S1, S2. Regular rate and rhythm. No murmurs, rubs or gallops. Abdomen: Soft, nontender, nondistended. Positive bowel sounds. No rebound or guarding. Neurologic: No focal neurological deficits.    Musculoskeletal: Moves all extr

## 2017-11-22 ENCOUNTER — APPOINTMENT (OUTPATIENT)
Dept: OCCUPATIONAL MEDICINE | Facility: HOSPITAL | Age: 47
End: 2017-11-22
Payer: COMMERCIAL

## 2017-11-22 NOTE — CM/SW NOTE
Patient discharged on 11/20/2017 as previously planned. 11/22/17 0800   Discharge disposition   Discharged to: Home or Self   Outpatient services Physical therapy; Occupational therapy   Discharge transportation Other (comment)

## 2017-11-28 ENCOUNTER — HOSPITAL ENCOUNTER (OUTPATIENT)
Dept: OCCUPATIONAL MEDICINE | Facility: HOSPITAL | Age: 47
Setting detail: THERAPIES SERIES
End: 2017-11-28
Payer: COMMERCIAL

## 2017-11-28 ENCOUNTER — APPOINTMENT (OUTPATIENT)
Dept: SPEECH THERAPY | Facility: HOSPITAL | Age: 47
End: 2017-11-28
Payer: OTHER MISCELLANEOUS

## 2017-11-30 ENCOUNTER — APPOINTMENT (OUTPATIENT)
Dept: PHYSICAL THERAPY | Facility: HOSPITAL | Age: 47
End: 2017-11-30
Payer: OTHER MISCELLANEOUS

## 2017-12-05 ENCOUNTER — APPOINTMENT (OUTPATIENT)
Dept: PHYSICAL THERAPY | Facility: HOSPITAL | Age: 47
End: 2017-12-05
Payer: OTHER MISCELLANEOUS

## 2017-12-05 ENCOUNTER — APPOINTMENT (OUTPATIENT)
Dept: SPEECH THERAPY | Facility: HOSPITAL | Age: 47
End: 2017-12-05
Payer: OTHER MISCELLANEOUS

## 2017-12-06 ENCOUNTER — APPOINTMENT (OUTPATIENT)
Dept: OCCUPATIONAL MEDICINE | Facility: HOSPITAL | Age: 47
End: 2017-12-06
Payer: COMMERCIAL

## 2017-12-11 ENCOUNTER — APPOINTMENT (OUTPATIENT)
Dept: SPEECH THERAPY | Facility: HOSPITAL | Age: 47
End: 2017-12-11
Payer: OTHER MISCELLANEOUS

## 2017-12-11 ENCOUNTER — APPOINTMENT (OUTPATIENT)
Dept: PHYSICAL THERAPY | Facility: HOSPITAL | Age: 47
End: 2017-12-11
Payer: OTHER MISCELLANEOUS

## 2017-12-13 ENCOUNTER — APPOINTMENT (OUTPATIENT)
Dept: OCCUPATIONAL MEDICINE | Facility: HOSPITAL | Age: 47
End: 2017-12-13
Payer: COMMERCIAL

## 2017-12-13 ENCOUNTER — APPOINTMENT (OUTPATIENT)
Dept: PHYSICAL THERAPY | Facility: HOSPITAL | Age: 47
End: 2017-12-13
Payer: OTHER MISCELLANEOUS

## 2017-12-18 ENCOUNTER — HOSPITAL ENCOUNTER (OUTPATIENT)
Dept: SPEECH THERAPY | Facility: HOSPITAL | Age: 47
Setting detail: THERAPIES SERIES
End: 2017-12-18
Payer: OTHER MISCELLANEOUS

## 2017-12-18 ENCOUNTER — APPOINTMENT (OUTPATIENT)
Dept: PHYSICAL THERAPY | Facility: HOSPITAL | Age: 47
End: 2017-12-18
Payer: OTHER MISCELLANEOUS

## 2017-12-21 ENCOUNTER — APPOINTMENT (OUTPATIENT)
Dept: PHYSICAL THERAPY | Facility: HOSPITAL | Age: 47
End: 2017-12-21
Payer: OTHER MISCELLANEOUS

## 2017-12-21 ENCOUNTER — APPOINTMENT (OUTPATIENT)
Dept: OCCUPATIONAL MEDICINE | Facility: HOSPITAL | Age: 47
End: 2017-12-21
Payer: COMMERCIAL

## 2017-12-26 ENCOUNTER — APPOINTMENT (OUTPATIENT)
Dept: OCCUPATIONAL MEDICINE | Facility: HOSPITAL | Age: 47
End: 2017-12-26
Payer: COMMERCIAL

## 2017-12-26 ENCOUNTER — APPOINTMENT (OUTPATIENT)
Dept: SPEECH THERAPY | Facility: HOSPITAL | Age: 47
End: 2017-12-26
Payer: OTHER MISCELLANEOUS

## 2017-12-27 ENCOUNTER — HOSPITAL ENCOUNTER (OUTPATIENT)
Dept: SPEECH THERAPY | Facility: HOSPITAL | Age: 47
Setting detail: THERAPIES SERIES
Discharge: HOME | End: 2017-12-27
Attending: OTOLARYNGOLOGY
Payer: OTHER MISCELLANEOUS

## 2017-12-27 PROCEDURE — 92524 BEHAVRAL QUALIT ANALYS VOICE: CPT

## 2017-12-27 NOTE — PROGRESS NOTES
ADULT VOICE EVALUATION  Referring Physician: Dr. Diana Carolina. Gage Yarbrough MD  Diagnosis: Dysphonia (R49.0)    Date of Service: 12/27/2017   Voice evaluation completed per MD order.   Lamar Aschoff arrived to evaluation on time and participated actively on assessment task • Back pain    • Broken legs 1992    both legs   • Bronchospasm, acute 3/8/2011   • Chronic sinusitis    • Dyspnea 2011    persistant   • Extrinsic asthma, unspecified    • Factor 5 Leiden mutation, heterozygous ([facility]    • Factor V Leiden ([facility] 12/30/20 difficulty, throat tightness, allergies, asthma, URI, or PND (medical chart states history of allergies, asthma, and pneumonia). Current Vocal Demands: low. Patient reported that he is  and lives at home alone (no pets).   He stated that he has vocal hygiene, diaphragmatic breathing, vocal function/laryngeal strengthening exercises, and resonant voice techniques to strengthen/balance laryngeal musculature for improved vocal quality.       Precautions:  None    OBJECTIVE  VOCAL ANALYSIS (Consensus exercises given min verbal/visual cues to strengthen laryngeal musculature for improved adduction (3 months).   STG 5: Patient will complete resonant voice exercises at word/phrase/sentence level with 80% accuracy given min verbal/visual cues to reduce byron

## 2017-12-28 ENCOUNTER — APPOINTMENT (OUTPATIENT)
Dept: PHYSICAL THERAPY | Facility: HOSPITAL | Age: 47
End: 2017-12-28
Payer: OTHER MISCELLANEOUS

## 2018-01-03 ENCOUNTER — APPOINTMENT (OUTPATIENT)
Dept: SPEECH THERAPY | Facility: HOSPITAL | Age: 48
End: 2018-01-03
Attending: OTOLARYNGOLOGY
Payer: OTHER MISCELLANEOUS

## 2018-01-10 ENCOUNTER — HOSPITAL ENCOUNTER (OUTPATIENT)
Dept: SPEECH THERAPY | Facility: HOSPITAL | Age: 48
Setting detail: THERAPIES SERIES
Discharge: HOME | End: 2018-01-10
Attending: OTOLARYNGOLOGY
Payer: OTHER MISCELLANEOUS

## 2018-01-10 PROCEDURE — 92507 TX SP LANG VOICE COMM INDIV: CPT

## 2018-01-10 NOTE — PROGRESS NOTES
Treatment #2 (WC; PN due 3/27/18)   Treatment Time: 60 minutes  Precautions: standard     Charges: 1 billed (32290)  Pain: 0/10      Diagnosis: Dysphonia (R49.0)             Subjective: Patient arrived to therapy on time. He stated he was [place]. \"  Patie increasing to two sets as he became stronger. STG 5: Patient will complete resonant voice exercises at word/phrase/sentence level with 80% accuracy given min verbal/visual cues to reduce laryngeal focus and increase oral resonance for speech (3 months).

## 2018-01-17 ENCOUNTER — APPOINTMENT (OUTPATIENT)
Dept: SPEECH THERAPY | Facility: HOSPITAL | Age: 48
End: 2018-01-17
Attending: OTOLARYNGOLOGY
Payer: OTHER MISCELLANEOUS

## 2018-01-24 ENCOUNTER — APPOINTMENT (OUTPATIENT)
Dept: SPEECH THERAPY | Facility: HOSPITAL | Age: 48
End: 2018-01-24
Attending: OTOLARYNGOLOGY
Payer: OTHER MISCELLANEOUS

## 2018-01-31 ENCOUNTER — APPOINTMENT (OUTPATIENT)
Dept: SPEECH THERAPY | Facility: HOSPITAL | Age: 48
End: 2018-01-31
Attending: OTOLARYNGOLOGY
Payer: OTHER MISCELLANEOUS

## 2018-02-07 ENCOUNTER — APPOINTMENT (OUTPATIENT)
Dept: SPEECH THERAPY | Facility: HOSPITAL | Age: 48
End: 2018-02-07
Attending: OTOLARYNGOLOGY
Payer: OTHER MISCELLANEOUS

## 2018-02-14 ENCOUNTER — APPOINTMENT (OUTPATIENT)
Dept: SPEECH THERAPY | Facility: HOSPITAL | Age: 48
End: 2018-02-14
Attending: OTOLARYNGOLOGY
Payer: OTHER MISCELLANEOUS

## 2018-11-01 ENCOUNTER — TELEPHONE (OUTPATIENT)
Dept: FAMILY MEDICINE CLINIC | Facility: CLINIC | Age: 48
End: 2018-11-01

## 2018-11-01 NOTE — TELEPHONE ENCOUNTER
Patient is due for office visit. Patient needs a office visit with a provider. Last time patient was in office his BP was high  If patient calls back he will need to be scheduled for a visit.

## 2019-03-26 PROBLEM — R45.86 EMOTIONAL LABILITY: Status: ACTIVE | Noted: 2018-01-04

## 2019-03-26 PROBLEM — R25.2 LEG CRAMP: Status: ACTIVE | Noted: 2018-03-23

## 2019-03-26 PROBLEM — E78.1 HYPERTRIGLYCERIDEMIA: Status: ACTIVE | Noted: 2018-01-09

## 2019-03-26 PROBLEM — A60.00 GENITAL HERPES SIMPLEX: Status: ACTIVE | Noted: 2019-03-26

## 2019-03-26 PROBLEM — R41.89 COGNITIVE CHANGE: Status: ACTIVE | Noted: 2018-01-04

## 2019-03-26 PROBLEM — Z51.89 ENCOUNTER FOR BLOOD TRANSFUSION: Status: ACTIVE | Noted: 2019-03-26

## 2019-03-26 PROBLEM — M79.651 BILATERAL THIGH PAIN: Status: ACTIVE | Noted: 2019-03-26

## 2019-03-26 PROBLEM — M79.652 BILATERAL THIGH PAIN: Status: ACTIVE | Noted: 2019-03-26

## 2019-03-26 PROBLEM — Z77.098 CHLORINE GAS EXPOSURE: Status: ACTIVE | Noted: 2019-03-26

## 2019-03-26 PROBLEM — I10 HTN (HYPERTENSION): Status: ACTIVE | Noted: 2018-01-04

## 2019-03-26 PROBLEM — R20.2 PARESTHESIA OF BOTH LOWER EXTREMITIES: Status: ACTIVE | Noted: 2019-03-26

## 2019-03-26 PROBLEM — J38.00 VOCAL FOLD PARESIS: Status: ACTIVE | Noted: 2018-01-04

## (undated) NOTE — ED AVS SNAPSHOT
[facility] Emergency Department in [address] [facility]    Phone:  [phone]    Fax:  [phone]           Mr. Luis Alberto Larson   MRN: [id number]    Department:  [facility] Emergency Department in [place]   Date of Visi Bring a paper prescription for each of these medications    - Omeprazole 40 MG Cpdr  - sucralfate 1 g Tabs              Discharge Instructions       Avoid aspirin, ibuprofen and Aleve. You may take plain Tylenol for pain.   Return to the emergency departm return to your personal doctor) about any new or lasting problems. The primary care or specialist physician will see patients referred from the [facility] Emergency Department. Follow-up care is at the discretion of that Physician.     IF THERE IS ANY - If you have concerns related to behavioral health issues or thoughts of harming yourself, contact [facility] at [phone].     - If you don’t have insurance, [facility] has partnered with Patient [place] C contrast coronal MIP imaging was performed. Dose reduction techniques were used. Dose information is   transmitted to the [facility]) NRDR ([address]) which includes the Dose Index Registry.      PATIENT STATE LUNG BASES:  Normal.  No visible pulmonary or pleural disease.     OTHER:  Noted is a subcutaneous nodule in the anterior chest wall of the diaphragm on image #1 measuring 1.7 cm and -1.4 Hounsfield units most likely a sebaceous cyst.               XR CHEST If you have questions, you can call [phone] to talk to our [facility] Staff. Remember, [employer] is NOT to be used for urgent needs. For medical emergencies, dial 911.

## (undated) NOTE — IP AVS SNAPSHOT
[facility] [place] [address] [place], [address] ~ [phone]                Discharge Summary   6/6/2017    Mr. Pandya Bonyzach           Admission Information        Provider Department    6/6/2017 DO Kj Hernandez 3ne-A         T Mometasone Furo-Formoterol Fum 200-5 MCG/ACT Aero   Next dose due: Tonight at bedtime        Inhale into the lungs 2 (two) times daily.                                Rivaroxaban 20 MG Tabs   Last time this was given:  20 mg on 6/8/2017  7:49 AM   Commonl 1.54 (06/07/17)  0.79 (H) (06/07/17)  0.22 (06/07/17)  0.04    (06/06/17)  65.1 (06/06/17)  26.4 (06/06/17)  6.8 (06/06/17)  1.0 (06/06/17)  0.4  (06/06/17)  4.38 (06/06/17)  1.78 (06/06/17)  0.46 (06/06/17)  0.07 (06/06/17)  7.84      Metabolic Lab Result THE [employer] will allow you to access patient instructions from your recent visit,  view other health information, and more. To sign up or find more information, go to [email or web address] and click on the Sign Up Now link in the [employer] box.      Enter with standing), heart rate changes, headaches, nausea/vomiting   What to report to your healthcare team:  Dizziness, nausea, chest pain, weakness, numbness           Blood Thinners (Anticoagulants)     Rivaroxaban (XARELTO) 20 MG Oral Tab       Use: Preven

## (undated) NOTE — ED AVS SNAPSHOT
THE [facility] Emergency Department in [address] [facility]    Phone:  [phone]    Fax:  [phone]           Mr. Leida Osorio   MRN: [id number]    Department:  THE [facility] Emergency Department in [place]   Date of Visi IF THERE IS ANY CHANGE OR WORSENING OF YOUR CONDITION, CALL YOUR PRIMARY CARE PHYSICIAN AT ONCE OR RETURN IMMEDIATELY TO THE EMERGENCY DEPARTMENT.     If you have been prescribed any medication(s), please fill your prescription right away and begin taking t

## (undated) NOTE — LETTER
Patient Name: Alan Almeida  YOB: 1970          MRN number:  [id number]  Date:  12/27/2017  Referring Physician: Michaela Grey MD     Dear Dr. Joseph Hernadez,    Thank you for your referral.  This letter is to inform you of initial findings fro due to vocal concerns. He reported that ENT diagnosed him with dysphonia and stated that vocal cord closure is incomplete and that voice therapy is advised.      Current functional limitations include: ability to speak comfortably during IADLs related to p Date of ENT Evaluation: 12/13/17; findings showed: SOB, dysphonia, and bilateral vocal fold paresis. History of current complaint/diagnosis: Patient reports hoarseness, vocal fatigue, decreased habitual pitch, decreased volume, SOB, and strain.   He stated negatively impacts ability to participate in IADLs. As such, voice therapy services are deemed necessary at this time.      Education/Intervention: Patient was provided with verbal/written instructions on the following topics related to treatment/intervent hygiene strategies over 2-3 sessions to improve behavioral factors influencing vocal health (3 months).     STG 2: Patient will independently demonstrate easy breathing techniques at rest in 8/10 trials to increase efficiency of inhalation/exhalation (3 mon Electronically signed by therapist: ISHMAEL Bettencourt    [de-identified] certification required: Yes  I certify the need for these services furnished under this plan of treatment and while under my care.     X________________________________________________

## (undated) NOTE — ED AVS SNAPSHOT
[facility] in [address] [place] [address],[address]    [address] [place] [address]    Phone:  [phone]    Fax:  [phone]           Mr. Jagdeep Gray   MRN: [id number]    Department:  [facility] in [facility] END   Date of Visit:  5/20/2017 If the symptoms worsen or do not improve he can return to the immediate care or go to the ER right away. Follow with orthopedics as per the appointment.     Discharge References/Attachments     CELLULITIS, DISCHARGE INSTRUCTIONS FOR (ENGLISH)      [facility] care or specialist physician will see patients referred from the [facility]. Follow-up care is at the discretion of that Physician.     IF THERE IS ANY CHANGE OR WORSENING OF YOUR CONDITION, CALL YOUR PRIMARY CARE PHYSICIAN AT ONCE OR GO TO THE harming yourself, contact [facility] at [phone]. - If you don’t have insurance, [facility] has partnered with Patient [address] to help you get signed up for insurance coverage.   Patient [place] information, go to [email or web address] and click on the Sign Up Now link in the [employer] box. Enter your [employer] Activation Code exactly as it appears below along with your Zip Code and Date of Birth to complete the sign-up process.  If you do

## (undated) NOTE — LETTER
Patient Name: Salvador Perez  YOB: 1970          MRN number:  [id number]  Date:  11/17/2017  Referring Physician:  Ana Stoner      NEUROLOGICAL EVALUATION:   Diagnosis: Critical Illness Myopathy     Date of Service: 11/16/2017     PATIENT distances now, not participating in social events, unable to drive, difficulty with stairs due to \"thigh pain. \" Body pain increases with all movements.  Veering with walking, hard to carry backpack or lift any weights including groceries because it is so his intense subjective symptoms at this time. He would benefit from skilled PT services to address the weakness limitations found and any potential balance/safety issues for return to prior functional level with less discomfort.  However, he has to be willi supine and I attempted pain education and importance of proper progressive movements and start of gentle walking program/mobility to improve blood flow for decreased nerve sensitivity.   Patient was in intense all over body pain with every movement and refu via fax as soon as possible to [phone].  If you have any questions, please contact me at Dept: [phone]    Sincerely,  Electronically signed by therapist: Cheyanne Webb DPT    Physician's certification required: Yes  I certify the need for these s